# Patient Record
Sex: FEMALE | Race: WHITE | URBAN - METROPOLITAN AREA
[De-identification: names, ages, dates, MRNs, and addresses within clinical notes are randomized per-mention and may not be internally consistent; named-entity substitution may affect disease eponyms.]

---

## 2022-12-10 ENCOUNTER — OFFICE (OUTPATIENT)
Dept: URBAN - METROPOLITAN AREA CLINIC 12 | Facility: CLINIC | Age: 62
Setting detail: OPHTHALMOLOGY
End: 2022-12-10
Payer: COMMERCIAL

## 2022-12-10 ENCOUNTER — RX ONLY (RX ONLY)
Age: 62
End: 2022-12-10

## 2022-12-10 VITALS — HEIGHT: 55 IN

## 2022-12-10 DIAGNOSIS — H04.123: ICD-10-CM

## 2022-12-10 DIAGNOSIS — H43.393: ICD-10-CM

## 2022-12-10 DIAGNOSIS — H10.45: ICD-10-CM

## 2022-12-10 PROCEDURE — 92014 COMPRE OPH EXAM EST PT 1/>: CPT | Performed by: OPHTHALMOLOGY

## 2022-12-10 ASSESSMENT — SPHEQUIV_DERIVED
OS_SPHEQUIV: 0.375
OS_SPHEQUIV: 0.75
OD_SPHEQUIV: 0.625
OD_SPHEQUIV: 0.125
OS_SPHEQUIV: 0.625
OD_SPHEQUIV: 1

## 2022-12-10 ASSESSMENT — AXIALLENGTH_DERIVED
OD_AL: 22.8664
OS_AL: 23.0018
OD_AL: 22.7299
OS_AL: 22.8638
OS_AL: 22.9096
OD_AL: 23.0509

## 2022-12-10 ASSESSMENT — REFRACTION_MANIFEST
OS_CYLINDER: -0.25
OS_VA1: 20/20-1
OD_CYLINDER: -0.50
OD_CYLINDER: -0.25
OD_AXIS: 015
OS_AXIS: 150
OD_SPHERE: +0.25
OD_AXIS: 40
OS_SPHERE: +0.75
OS_CYLINDER: -0.25
OS_AXIS: 160
OS_VA1: 20/15
OD_VA1: 20/25
OD_VA1: 20/15
OD_SPHERE: +1.25
OS_SPHERE: +0.50

## 2022-12-10 ASSESSMENT — VISUAL ACUITY
OD_BCVA: 20/25-1
OS_BCVA: 20/20-1

## 2022-12-10 ASSESSMENT — REFRACTION_CURRENTRX
OD_VPRISM_DIRECTION: SV
OD_ADD: +2.00
OS_ADD: +2.00
OD_OVR_VA: 20/
OS_VPRISM_DIRECTION: SV
OS_OVR_VA: 20/
OS_VPRISM_DIRECTION: SV
OS_OVR_VA: 20/
OD_SPHERE: +1.50
OD_OVR_VA: 20/
OS_SPHERE: +1.50
OD_VPRISM_DIRECTION: SV

## 2022-12-10 ASSESSMENT — KERATOMETRY
OD_K2POWER_DIOPTERS: 45.00
METHOD_AUTO_MANUAL: AUTO
OS_AXISANGLE_DEGREES: 060
OD_K1POWER_DIOPTERS: 44.75
OD_AXISANGLE_DEGREES: 015
OS_K2POWER_DIOPTERS: 45.00
OS_K1POWER_DIOPTERS: 44.50

## 2022-12-10 ASSESSMENT — REFRACTION_AUTOREFRACTION
OD_CYLINDER: -0.75
OD_AXIS: 088
OS_AXIS: 101
OD_SPHERE: +1.00
OS_SPHERE: +1.00
OS_CYLINDER: -0.50

## 2022-12-10 ASSESSMENT — CONFRONTATIONAL VISUAL FIELD TEST (CVF)
OS_FINDINGS: FULL
OD_FINDINGS: FULL

## 2022-12-10 ASSESSMENT — TONOMETRY
OD_IOP_MMHG: 20
OS_IOP_MMHG: 20

## 2023-03-16 PROBLEM — Z00.00 ENCOUNTER FOR PREVENTIVE HEALTH EXAMINATION: Status: ACTIVE | Noted: 2023-03-16

## 2023-03-20 PROBLEM — Z87.19 HISTORY OF DIVERTICULITIS: Status: ACTIVE | Noted: 2023-03-20

## 2023-03-20 PROBLEM — M25.559 HIP PAIN: Status: ACTIVE | Noted: 2023-03-20

## 2023-03-20 PROBLEM — M19.90 ARTHRITIS: Status: ACTIVE | Noted: 2023-03-20

## 2023-03-20 PROBLEM — Z91.89 AT RISK OF DIABETES MELLITUS: Status: RESOLVED | Noted: 2023-03-20 | Resolved: 2023-03-20

## 2023-03-20 PROBLEM — R71.8 ELEVATED RED BLOOD CELL COUNT: Status: ACTIVE | Noted: 2023-03-20

## 2023-03-20 PROBLEM — Z86.79 HISTORY OF HYPERTENSION: Status: RESOLVED | Noted: 2023-03-20 | Resolved: 2023-03-20

## 2023-03-20 PROBLEM — M25.569 KNEE PAIN: Status: ACTIVE | Noted: 2023-03-20

## 2023-03-20 PROBLEM — E78.00 HIGH CHOLESTEROL: Status: ACTIVE | Noted: 2023-03-20

## 2023-03-20 PROBLEM — J45.909 ASTHMA: Status: ACTIVE | Noted: 2023-03-20

## 2023-03-20 PROBLEM — Z87.19 HISTORY OF CHRONIC CONSTIPATION: Status: RESOLVED | Noted: 2023-03-20 | Resolved: 2023-03-20

## 2023-03-20 PROBLEM — K76.0 FATTY LIVER: Status: ACTIVE | Noted: 2023-03-20

## 2023-03-20 PROBLEM — E66.9 OBESE: Status: ACTIVE | Noted: 2023-03-20

## 2023-03-20 PROBLEM — K80.20 GALLSTONES: Status: ACTIVE | Noted: 2023-03-20

## 2023-03-20 PROBLEM — N28.1 CYST OF LEFT KIDNEY: Status: ACTIVE | Noted: 2023-03-20

## 2023-03-20 RX ORDER — LOSARTAN POTASSIUM 100 MG/1
TABLET, FILM COATED ORAL
Refills: 0 | Status: ACTIVE | COMMUNITY

## 2023-03-20 RX ORDER — ALBUTEROL 90 MCG
AEROSOL (GRAM) INHALATION
Refills: 0 | Status: ACTIVE | COMMUNITY

## 2023-03-21 ENCOUNTER — RESULT CHARGE (OUTPATIENT)
Age: 63
End: 2023-03-21

## 2023-03-21 ENCOUNTER — APPOINTMENT (OUTPATIENT)
Age: 63
End: 2023-03-21
Payer: COMMERCIAL

## 2023-03-21 VITALS
SYSTOLIC BLOOD PRESSURE: 143 MMHG | HEIGHT: 61 IN | WEIGHT: 200 LBS | BODY MASS INDEX: 37.76 KG/M2 | DIASTOLIC BLOOD PRESSURE: 82 MMHG

## 2023-03-21 DIAGNOSIS — J45.909 UNSPECIFIED ASTHMA, UNCOMPLICATED: ICD-10-CM

## 2023-03-21 DIAGNOSIS — Z91.89 OTHER SPECIFIED PERSONAL RISK FACTORS, NOT ELSEWHERE CLASSIFIED: ICD-10-CM

## 2023-03-21 DIAGNOSIS — Z82.49 FAMILY HISTORY OF ISCHEMIC HEART DISEASE AND OTHER DISEASES OF THE CIRCULATORY SYSTEM: ICD-10-CM

## 2023-03-21 DIAGNOSIS — Z78.9 OTHER SPECIFIED HEALTH STATUS: ICD-10-CM

## 2023-03-21 DIAGNOSIS — Z87.19 PERSONAL HISTORY OF OTHER DISEASES OF THE DIGESTIVE SYSTEM: ICD-10-CM

## 2023-03-21 DIAGNOSIS — R71.8 OTHER ABNORMALITY OF RED BLOOD CELLS: ICD-10-CM

## 2023-03-21 DIAGNOSIS — N28.1 CYST OF KIDNEY, ACQUIRED: ICD-10-CM

## 2023-03-21 DIAGNOSIS — K76.0 FATTY (CHANGE OF) LIVER, NOT ELSEWHERE CLASSIFIED: ICD-10-CM

## 2023-03-21 DIAGNOSIS — K80.20 CALCULUS OF GALLBLADDER W/OUT CHOLECYSTITIS W/OUT OBSTRUCTION: ICD-10-CM

## 2023-03-21 DIAGNOSIS — E66.9 OBESITY, UNSPECIFIED: ICD-10-CM

## 2023-03-21 DIAGNOSIS — M25.559 PAIN IN UNSPECIFIED HIP: ICD-10-CM

## 2023-03-21 DIAGNOSIS — M25.569 PAIN IN UNSPECIFIED KNEE: ICD-10-CM

## 2023-03-21 DIAGNOSIS — M19.90 UNSPECIFIED OSTEOARTHRITIS, UNSPECIFIED SITE: ICD-10-CM

## 2023-03-21 DIAGNOSIS — Z83.79 FAMILY HISTORY OF OTHER DISEASES OF THE DIGESTIVE SYSTEM: ICD-10-CM

## 2023-03-21 DIAGNOSIS — Z86.79 PERSONAL HISTORY OF OTHER DISEASES OF THE CIRCULATORY SYSTEM: ICD-10-CM

## 2023-03-21 DIAGNOSIS — E78.00 PURE HYPERCHOLESTEROLEMIA, UNSPECIFIED: ICD-10-CM

## 2023-03-21 DIAGNOSIS — Z83.6 FAMILY HISTORY OF OTHER DISEASES OF THE RESPIRATORY SYSTEM: ICD-10-CM

## 2023-03-21 DIAGNOSIS — Z83.49 FAMILY HISTORY OF OTHER ENDOCRINE, NUTRITIONAL AND METABOLIC DISEASES: ICD-10-CM

## 2023-03-21 DIAGNOSIS — Z82.5 FAMILY HISTORY OF ASTHMA AND OTHER CHRONIC LOWER RESPIRATORY DISEASES: ICD-10-CM

## 2023-03-21 LAB
BILIRUB UR QL STRIP: NEGATIVE
CLARITY UR: CLEAR
COLLECTION METHOD: NORMAL
GLUCOSE UR-MCNC: NEGATIVE
HCG UR QL: 0.2 EU/DL
HGB UR QL STRIP.AUTO: NEGATIVE
KETONES UR-MCNC: NEGATIVE
LEUKOCYTE ESTERASE UR QL STRIP: NEGATIVE
NITRITE UR QL STRIP: NEGATIVE
PH UR STRIP: 5.5
PROT UR STRIP-MCNC: NEGATIVE
SP GR UR STRIP: 1

## 2023-03-21 PROCEDURE — 81003 URINALYSIS AUTO W/O SCOPE: CPT | Mod: QW

## 2023-03-21 PROCEDURE — 51701 INSERT BLADDER CATHETER: CPT | Mod: 59

## 2023-03-21 PROCEDURE — 99204 OFFICE O/P NEW MOD 45 MIN: CPT | Mod: 25

## 2023-03-21 RX ORDER — VIBEGRON 75 MG/1
75 TABLET, FILM COATED ORAL
Qty: 30 | Refills: 0 | Status: ACTIVE | COMMUNITY
Start: 2023-03-21 | End: 1900-01-01

## 2023-03-21 NOTE — DISCUSSION/SUMMARY
[FreeTextEntry1] : \par Efren presents for eval of STEVEN, OAB predominate complaints.  We specifically talked about OAB. We reviewed her symptoms and exam findings. We discussed management options for overactive bladder including observation, behavioral modifications and bladder training, physical therapy and medications including anticholinergics and beta 3 agonists. We discussed if behavioral modifications and medications fail proceeding with urodynamics to further evaluate her symptoms. We discussed additional treatment options including sacral neuromodulation, PTNS and intra detrusor Botox. IUGA handout on overactive bladder and bladder training was given to her.\par \par We discussed possible etiologies of her symptoms including both stress urinary incontinence and overactive bladder. We reviewed management options for both conditions. I recommend further workup of her urinary symptoms with urodynamic testing. We reviewed behavioral modifications and bladder training. Written and verbal instructions  were provided to her as well. She will return to my office for urodynamics and followup with me to discuss results and management options further.\par \par [] Gemtesa\par [] UDS\par [] reeval- possible intadetrusor botox\par

## 2023-03-21 NOTE — PHYSICAL EXAM
[No Acute Distress] : in no acute distress [Well developed] : well developed [Well Nourished] : ~L well nourished [Good Hygeine] : demonstrates good hygeine [Oriented x3] : oriented to person, place, and time [Normal Memory] : ~T memory was ~L unimpaired [Normal Lung Sounds] : the lungs were clear to auscultation [Respirations regular] : ~T respiratory rate was regular [Rate & Rhythm Regular] : ~T heart rate and rhythm were normal [Scar] : a scar was noted [None] : no CVA tenderness [Normal Gait] : gait was normal [Labia Majora] : were normal [Labia Minora] : were normal [Normal Appearance] : general appearance was normal [Atrophy] : atrophy [] : 0 [Post Void Residual ____ml] : post void residual was [unfilled] ml [Chaperone Present] : A chaperone was present in the examining room during all aspects of the physical examination [Mass (___ Cm)] : no ~M [unfilled] abdominal mass was palpated [Tenderness] : ~T no ~M abdominal tenderness observed [Distended] : not distended [1] : 1 [Normal] : no abnormalities [de-identified] : no POP [de-identified] : + empty CST

## 2023-03-21 NOTE — PROCEDURE
[Straight Catheterization] : insertion of a straight catheter [Urinary Frequency] : urinary frequency [Patient] : the patient [___ Fr Straight Tip] : a [unfilled] in [place] straight tip catheter [None] : there were no complications with the catheter insertion [Clear] : clear [No Complications] : no complications [Tolerated Well] : the patient tolerated the procedure well [Post procedure instructions and information given] : Post procedure instructions and information were given and reviewed with patient. [0] : 0

## 2023-03-21 NOTE — REASON FOR VISIT
[Urinary Incontinence] : urinary incontinence [Urine Frequency] : urine frequency [Urinary Urgency] : urinary urgency [Initial Visit ___] : an initial visit for [unfilled] [FreeTextEntry2] : Nocturia 3x

## 2023-03-21 NOTE — HISTORY OF PRESENT ILLNESS
[FreeTextEntry1] : Patient is a 62 y.o (LMP 15 y.o) presenting for evaluation of urinary frequency/urgency.\par \par Patient reporting increased urinary frequency and urgency x several years. Reports issue has been worsening over past several months, and affecting QOL. When voiding, Reports intermittent urinary stream.\par Also reporting leakage with coughing, laughing sneezing. Wears extra large pads due to incontinence (uses 6-8 pads/day). +Nocturnal enuresis (>2-3x/night).  Denies any improvement with kegel exercise.Denies any pelvic pain or vaginal bulge like symptoms.Reports daily constipation. Takes fiber supplementation. Adjusted diet to accommodate- intermittent fasting. \par \par Of note, sleeps in a recliner due to OA and CISCO. Reports limited mobility due to OA\par \par Cardiologist: Dr. Jones, Echo- WNL\par \par Obhx: x2, C/Sx1, Largest 8#15oz\par Gynhx: Last pap (2022)- WNL, Not sexually active \par PMH: HTN, HLD, Asthma, NAFL, Diverticulosis, left renal cyst, Gallstones, Thyroid nodule/cyst s/p bx, Pre-DM\par Surghx:D&C, C/Sx1, EMB\par Social: Denies, Daily caffeine use\par ALL: NKDA, Cats, pollen, oak, pine\par Meds: Losartan 25mg, Albuterol prn, Multivitamins \par Famhx: Father- passed @ 30s due to MVP/Genetic fatty liver

## 2023-03-21 NOTE — OB HISTORY
[Total Preg ___] : : [unfilled] [Vaginal ___] : [unfilled] vaginal delivery(s) [ ___] : [unfilled]  section delivery(s) [Definite ___ (Date)] : the last menstrual period was [unfilled] [Last Pap Smear ___] : date of last pap smear was on [unfilled] [Abnormal Pap Smear] : normal pap smear [Taking Estrogens] : is not taking estrogen replacement [Sexually Active] : is not sexually active

## 2023-05-10 ENCOUNTER — APPOINTMENT (OUTPATIENT)
Dept: UROGYNECOLOGY | Facility: CLINIC | Age: 63
End: 2023-05-10

## 2023-05-30 ENCOUNTER — APPOINTMENT (OUTPATIENT)
Dept: UROGYNECOLOGY | Facility: CLINIC | Age: 63
End: 2023-05-30
Payer: COMMERCIAL

## 2023-05-30 DIAGNOSIS — N39.3 STRESS INCONTINENCE (FEMALE) (MALE): ICD-10-CM

## 2023-05-30 DIAGNOSIS — R35.1 NOCTURIA: ICD-10-CM

## 2023-05-30 DIAGNOSIS — R39.15 URGENCY OF URINATION: ICD-10-CM

## 2023-05-30 DIAGNOSIS — R35.0 FREQUENCY OF MICTURITION: ICD-10-CM

## 2023-05-30 PROCEDURE — 51741 ELECTRO-UROFLOWMETRY FIRST: CPT

## 2023-05-30 PROCEDURE — 51784 ANAL/URINARY MUSCLE STUDY: CPT

## 2023-05-30 PROCEDURE — 51729 CYSTOMETROGRAM W/VP&UP: CPT

## 2023-05-30 PROCEDURE — 51797 INTRAABDOMINAL PRESSURE TEST: CPT

## 2023-05-30 RX ORDER — VIBEGRON 75 MG/1
75 TABLET, FILM COATED ORAL
Qty: 90 | Refills: 1 | Status: ACTIVE | COMMUNITY
Start: 2023-05-30 | End: 1900-01-01

## 2023-09-05 ENCOUNTER — APPOINTMENT (OUTPATIENT)
Dept: UROGYNECOLOGY | Facility: CLINIC | Age: 63
End: 2023-09-05

## 2023-12-16 ENCOUNTER — RX ONLY (RX ONLY)
Age: 63
End: 2023-12-16

## 2023-12-16 ENCOUNTER — OFFICE (OUTPATIENT)
Dept: URBAN - METROPOLITAN AREA CLINIC 12 | Facility: CLINIC | Age: 63
Setting detail: OPHTHALMOLOGY
End: 2023-12-16
Payer: COMMERCIAL

## 2023-12-16 VITALS — HEIGHT: 55 IN

## 2023-12-16 DIAGNOSIS — H04.123: ICD-10-CM

## 2023-12-16 DIAGNOSIS — Z96.1: ICD-10-CM

## 2023-12-16 DIAGNOSIS — H43.393: ICD-10-CM

## 2023-12-16 DIAGNOSIS — H10.45: ICD-10-CM

## 2023-12-16 PROCEDURE — 92014 COMPRE OPH EXAM EST PT 1/>: CPT | Performed by: OPHTHALMOLOGY

## 2023-12-16 ASSESSMENT — REFRACTION_CURRENTRX
OS_OVR_VA: 20/
OS_VPRISM_DIRECTION: SV
OD_ADD: +2.00
OD_VPRISM_DIRECTION: SV
OD_OVR_VA: 20/
OD_SPHERE: +1.50
OD_VPRISM_DIRECTION: SV
OS_OVR_VA: 20/
OS_ADD: +2.00
OD_OVR_VA: 20/
OS_VPRISM_DIRECTION: SV
OS_SPHERE: +1.50

## 2023-12-16 ASSESSMENT — REFRACTION_AUTOREFRACTION
OD_SPHERE: +1.25
OD_AXIS: 085
OD_CYLINDER: -0.75
OS_AXIS: 093
OS_CYLINDER: -0.75
OS_SPHERE: +1.25

## 2023-12-16 ASSESSMENT — REFRACTION_MANIFEST
OS_SPHERE: +0.75
OD_AXIS: 015
OS_SPHERE: +2.50
OS_VA1: 20/20-1
OS_CYLINDER: -0.25
OD_CYLINDER: -0.25
OS_AXIS: 90
OD_AXIS: 85
OD_CYLINDER: -0.50
OD_SPHERE: +1.25
OD_VA1: 20/25
OS_AXIS: 150
OS_CYLINDER: -0.25
OD_SPHERE: +2.50

## 2023-12-16 ASSESSMENT — SPHEQUIV_DERIVED
OD_SPHEQUIV: 2.375
OS_SPHEQUIV: 2.375
OD_SPHEQUIV: 0.875
OS_SPHEQUIV: 0.625
OD_SPHEQUIV: 1
OS_SPHEQUIV: 0.875

## 2023-12-16 ASSESSMENT — CONFRONTATIONAL VISUAL FIELD TEST (CVF)
OS_FINDINGS: FULL
OD_FINDINGS: FULL

## 2024-02-20 NOTE — ADDENDUM
[FreeTextEntry1] : This note was written by Crystal Escalante, acting as the  for Dr. Carlson. This note accurately reflects the work and decisions made by Dr. Carlson.

## 2024-02-20 NOTE — DISCUSSION/SUMMARY
[FreeTextEntry1] : GIOVANNY is a 63 year female who presents for f/u on OAB.  [] []  f/u All questions answered.

## 2024-02-20 NOTE — HISTORY OF PRESENT ILLNESS
[Unable To Restrain Bowel Movement] : mild [Feelings Of Urinary Urgency] : mild [Urinary Tract Infection] : mild [] : years ago [FreeTextEntry1] : GIOVANNY is a 63 year female who presents for f/u on OAB. Last seen by me 03/21/2023. H/o of Gemtesa.     PARVIZS with MARIAELENA 05/30/2023

## 2024-02-22 ENCOUNTER — APPOINTMENT (OUTPATIENT)
Dept: UROGYNECOLOGY | Facility: CLINIC | Age: 64
End: 2024-02-22

## 2024-12-19 ENCOUNTER — OFFICE (OUTPATIENT)
Dept: URBAN - METROPOLITAN AREA CLINIC 12 | Facility: CLINIC | Age: 64
Setting detail: OPHTHALMOLOGY
End: 2024-12-19
Payer: COMMERCIAL

## 2024-12-19 DIAGNOSIS — H04.123: ICD-10-CM

## 2024-12-19 DIAGNOSIS — H10.45: ICD-10-CM

## 2024-12-19 DIAGNOSIS — H43.393: ICD-10-CM

## 2024-12-19 PROCEDURE — 92014 COMPRE OPH EXAM EST PT 1/>: CPT | Performed by: OPHTHALMOLOGY

## 2024-12-19 ASSESSMENT — KERATOMETRY
OD_AXISANGLE_DEGREES: 090
OD_K2POWER_DIOPTERS: 44.75
OS_K1POWER_DIOPTERS: 44.50
OS_K2POWER_DIOPTERS: 45.00
METHOD_AUTO_MANUAL: AUTO
OD_K1POWER_DIOPTERS: 44.75
OS_AXISANGLE_DEGREES: 054

## 2024-12-19 ASSESSMENT — REFRACTION_CURRENTRX
OS_OVR_VA: 20/
OD_OVR_VA: 20/
OS_SPHERE: +1.50
OD_ADD: +2.00
OS_VPRISM_DIRECTION: SV
OD_SPHERE: +1.50
OD_OVR_VA: 20/
OD_VPRISM_DIRECTION: SV
OS_VPRISM_DIRECTION: SV
OS_ADD: +2.00
OD_VPRISM_DIRECTION: SV
OS_OVR_VA: 20/

## 2024-12-19 ASSESSMENT — REFRACTION_AUTOREFRACTION
OS_CYLINDER: -1.00
OD_SPHERE: +1.25
OD_AXIS: 072
OD_CYLINDER: -0.50
OS_SPHERE: +1.50
OS_AXIS: 103

## 2024-12-19 ASSESSMENT — REFRACTION_MANIFEST
OD_SPHERE: +2.50
OD_AXIS: 080
OS_SPHERE: +2.75
OS_AXIS: 098
OS_CYLINDER: -0.50
OD_VA1: 20/25
OS_AXIS: 90
OD_AXIS: 85
OD_SPHERE: +2.75
OS_CYLINDER: -0.25
OS_VA1: 20/20-1
OD_CYLINDER: -0.25
OS_SPHERE: +2.50
OD_CYLINDER: -0.25

## 2024-12-19 ASSESSMENT — CONFRONTATIONAL VISUAL FIELD TEST (CVF)
OS_FINDINGS: FULL
OD_FINDINGS: FULL

## 2024-12-19 ASSESSMENT — TONOMETRY
OS_IOP_MMHG: 19
OD_IOP_MMHG: 19

## 2024-12-19 ASSESSMENT — VISUAL ACUITY
OD_BCVA: 20/20
OS_BCVA: 20/20-1